# Patient Record
Sex: FEMALE | URBAN - METROPOLITAN AREA
[De-identification: names, ages, dates, MRNs, and addresses within clinical notes are randomized per-mention and may not be internally consistent; named-entity substitution may affect disease eponyms.]

---

## 2017-01-24 ENCOUNTER — APPOINTMENT (RX ONLY)
Dept: URBAN - METROPOLITAN AREA CLINIC 29 | Facility: CLINIC | Age: 47
Setting detail: DERMATOLOGY
End: 2017-01-24

## 2017-01-24 DIAGNOSIS — Z41.1 ENCOUNTER FOR COSMETIC SURGERY: ICD-10-CM

## 2017-01-24 DIAGNOSIS — Z48.89 ENCOUNTER FOR OTHER SPECIFIED SURGICAL AFTERCARE: ICD-10-CM

## 2017-01-24 PROCEDURE — ? CONSULTATION - ABDOMINOPLASTY

## 2017-01-24 PROCEDURE — ? RECOMMENDATIONS

## 2017-01-24 PROCEDURE — ? COUNSELING - POST-OP CHECK, BODY CONTOURING

## 2017-01-24 PROCEDURE — ? CONSULTATION - AESTHETIC FACIAL DEFORMITY

## 2017-01-24 ASSESSMENT — LOCATION SIMPLE DESCRIPTION DERM
LOCATION SIMPLE: LEFT ANTERIOR NECK
LOCATION SIMPLE: RIGHT ANTERIOR NECK

## 2017-01-24 ASSESSMENT — LOCATION DETAILED DESCRIPTION DERM
LOCATION DETAILED: RIGHT INFERIOR ANTERIOR NECK
LOCATION DETAILED: LEFT INFERIOR ANTERIOR NECK

## 2017-01-24 ASSESSMENT — LOCATION ZONE DERM: LOCATION ZONE: NECK

## 2017-01-24 NOTE — PROCEDURE: RECOMMENDATIONS
Recommendation Preamble: The following recommendations were made during the visit:
Detail Level: Zone
Recommendations (Free Text): -due to collection of fat after liposuction on several areas on abdomen ... will do liposuction revision\\n-discussed fat transfer to areas on face with volume deficiency

## 2017-03-21 ENCOUNTER — APPOINTMENT (RX ONLY)
Dept: URBAN - METROPOLITAN AREA CLINIC 29 | Facility: CLINIC | Age: 47
Setting detail: DERMATOLOGY
End: 2017-03-21

## 2017-03-21 DIAGNOSIS — Z41.1 ENCOUNTER FOR COSMETIC SURGERY: ICD-10-CM

## 2017-03-21 PROCEDURE — ? CONSULTATION - ABDOMINOPLASTY

## 2017-03-21 PROCEDURE — ? RECOMMENDATIONS

## 2017-03-21 NOTE — PROCEDURE: RECOMMENDATIONS
Recommendations (Free Text): 1. Revision liposuction of the abdomen. (1hr)\\n2. Revision liposuction of the abdomen and facial fat grafting (1.5h)\\n\\n-advised patient to hold liposuction of the neck. \\n-patient would like to have facial fat grafting if she can afford it, pending quote.\\n-patient has d/c humira for at least two weeks today\\n-cleanse with hibiclens
Recommendation Preamble: The following recommendations were made during the visit:
Detail Level: Zone

## 2017-03-21 NOTE — HPI: PREOPERATIVE APPOINTMENT
Has The Patient Completed Informed Consent?: is scheduled to complete informed consent documentation during this visit
Has The Patient Fulfilled Financial Responsibilities For Surgical Scheduling?: is scheduled to complete payment to fulfill financial responsibilities during this visit
Date Of Procedure?: 04/05/17
Facility: [facility]
Surgeon: Sharif

## 2017-04-06 ENCOUNTER — APPOINTMENT (RX ONLY)
Dept: URBAN - METROPOLITAN AREA CLINIC 5 | Facility: CLINIC | Age: 47
Setting detail: DERMATOLOGY
End: 2017-04-06

## 2017-04-06 DIAGNOSIS — Z48.89 ENCOUNTER FOR OTHER SPECIFIED SURGICAL AFTERCARE: ICD-10-CM

## 2017-04-06 PROCEDURE — ? PATIENT SPECIFIC COUNSELING

## 2017-04-06 PROCEDURE — ? COUNSELING - POST-OP CHECK, LIPOSUCTION

## 2017-04-06 NOTE — PROCEDURE: PATIENT SPECIFIC COUNSELING
Other (Free Text): -massage only for 5-10 min 2-3 times daily\\n-be mindful of how garment is fitting, medium garment provided. Be wary of creasing/bunching of garment
Detail Level: Zone